# Patient Record
Sex: MALE | Race: AMERICAN INDIAN OR ALASKA NATIVE
[De-identification: names, ages, dates, MRNs, and addresses within clinical notes are randomized per-mention and may not be internally consistent; named-entity substitution may affect disease eponyms.]

---

## 2021-12-14 ENCOUNTER — HOSPITAL ENCOUNTER (EMERGENCY)
Dept: HOSPITAL 5 - ED | Age: 51
Discharge: HOME | End: 2021-12-14
Payer: COMMERCIAL

## 2021-12-14 VITALS — DIASTOLIC BLOOD PRESSURE: 86 MMHG | SYSTOLIC BLOOD PRESSURE: 154 MMHG
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
## 2021-12-14 DIAGNOSIS — F17.200: ICD-10-CM

## 2021-12-14 DIAGNOSIS — Z91.013: ICD-10-CM
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
## 2021-12-14 DIAGNOSIS — S60.312A: Primary | ICD-10-CM
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
## 2021-12-14 PROCEDURE — 99282 EMERGENCY DEPT VISIT SF MDM: CPT

## 2021-12-14 PROCEDURE — 90471 IMMUNIZATION ADMIN: CPT

## 2021-12-14 PROCEDURE — 90715 TDAP VACCINE 7 YRS/> IM: CPT

## 2021-12-14 NOTE — EMERGENCY DEPARTMENT REPORT
ED Laceration HPI





- HPI


Chief Complaint: Wound/Laceration


Stated Complaint: LACERATION


Time Seen by Provider: 12/14/21 04:18


Occurred When: Yesterday


Location: Upper Extremity


Severity: mild


Tetanus Status: Not up to Date


Laceration Symptoms: Yes Pain, No Foreign Body Sensation, No Numbness, No 

Weakness


Other History: This is a 51-year-old male nontoxic, well nourished in 

appearance, no acute signs of distress presents to the ED with c/o of left 

distal thumb abrasion that occurred last night around 8 PM.  Patient stated he 

was moving a stove that caused the injury.  Patient denies decreased sensation 

or range of motion.  Patient stated that it is still bleeding which is why he 

came in today.  Denies any numbness, tingling, fever, chills, nausea, vomiting, 

chest pain, shortness of breath, headache or stiff neck.  Patient denies any 

allergies to significant past medical history.  Patient is that he is not up-to-

date with tetanus.





ED Review of Systems


ROS: 


Stated complaint: LACERATION


Other details as noted in HPI





Comment: All other systems reviewed and negative


Constitutional: denies: chills, fever


Eyes: denies: eye pain, eye discharge, vision change


ENT: denies: ear pain, throat pain


Respiratory: denies: cough, shortness of breath, wheezing


Cardiovascular: denies: chest pain, palpitations


Endocrine: no symptoms reported


Gastrointestinal: denies: abdominal pain, nausea, diarrhea


Genitourinary: denies: urgency, dysuria


Musculoskeletal: denies: back pain, joint swelling, arthralgia


Skin: denies: rash, lesions


Neurological: denies: headache, weakness, paresthesias


Psychiatric: denies: anxiety, depression


Hematological/Lymphatic: denies: easy bleeding, easy bruising





ED Past Medical Hx





- Past Medical History


Previous Medical History?: No





- Surgical History


Past Surgical History?: No





- Social History


Smoking Status: Current Every Day Smoker


Substance Use Type: None





- Medications


Home Medications: 


                                Home Medications











 Medication  Instructions  Recorded  Confirmed  Last Taken  Type


 


predniSONE [Prednisone] 40 mg PO DAILY #6 tablet 06/14/15  Unknown Rx


 


Ibuprofen [Motrin] 800 mg PO Q8HR PRN #60 tablet 09/30/15  Unknown Rx


 


traMADoL [Ultram] 50 mg PO Q6HR PRN #20 tablet 09/30/15  Unknown Rx














Laceration Physical Exam





- Exam


General: 


Vital signs noted. No distress. Alert and acting appropriately.





Wound Length (cm): 1 (Abrasion)


Laceration Location: Upper Extremity (Left distal thumb)


Laceration Exam: Yes Normal Distal CMS, No Foreign Body, No Exposed Tendon, 

Vessel, or Nerve, No Tendon Injury





ED Course


                                   Vital Signs











  12/14/21





  04:06


 


Temperature 98 F


 


Pulse Rate 74


 


Respiratory 18





Rate 


 


Blood Pressure 154/86





[Right] 


 


O2 Sat by Pulse 100





Oximetry 














- Reevaluation(s)


Reevaluation #1: 





12/14/21 05:19


Patient is speaking in full sentences with no signs of distress noted.





ED Medical Decision Making





- Medical Decision Making





51-year-old male that presents with a abrasion.  Patient is stable and was 

examined by me.  A pressure dressing has been applied and bleeding has stopped 

and is currently under control.  Patient did receive tetanus in the ER.  

Educated patient on wound care.  Patient was instructed to follow-up with a 

primary care doctor in 3-5 days or if symptoms worsen and continue return to 

emergency room as soon as possible.  At time of discharge, the patient does not 

seem toxic or ill in appearance.  No acute signs of distress noted.  Patient 

agrees to discharge treatment plan of care.  No further questions noted by the 

patient.


Critical care attestation.: 


If time is entered above; I have spent that time in minutes in the direct care 

of this critically ill patient, excluding procedure time.








ED Disposition


Clinical Impression: 


Abrasion of finger, left


Qualifiers:


 Encounter type: initial encounter Qualified Code(s): S60.419A - Abrasion of 

unspecified finger, initial encounter





Disposition: 01 HOME / SELF CARE / HOMELESS


Is pt being admited?: No


Does the pt Need Aspirin: No


Condition: Stable


Instructions:  Wound Care, Adult


Additional Instructions: 


Follow-up with a primary care doctor in 3-5 days or if symptoms worsen and 

continue return to emergency room as soon as possible. 


Referrals: 


LORE DANIEL MD [Primary Care Provider] - 3-5 Days


MAYRA SANTANA MD [Staff Physician] - 3-5 Days


Time of Disposition: 05:20